# Patient Record
Sex: MALE | Race: WHITE | NOT HISPANIC OR LATINO | Employment: FULL TIME | URBAN - METROPOLITAN AREA
[De-identification: names, ages, dates, MRNs, and addresses within clinical notes are randomized per-mention and may not be internally consistent; named-entity substitution may affect disease eponyms.]

---

## 2021-03-01 ENCOUNTER — OFFICE VISIT (OUTPATIENT)
Dept: ENDOCRINOLOGY | Facility: CLINIC | Age: 61
End: 2021-03-01

## 2021-03-01 ENCOUNTER — TELEPHONE (OUTPATIENT)
Dept: ENDOCRINOLOGY | Facility: CLINIC | Age: 61
End: 2021-03-01

## 2021-03-01 VITALS
DIASTOLIC BLOOD PRESSURE: 80 MMHG | HEART RATE: 74 BPM | WEIGHT: 202.8 LBS | HEIGHT: 68 IN | TEMPERATURE: 99.6 F | OXYGEN SATURATION: 97 % | SYSTOLIC BLOOD PRESSURE: 148 MMHG | BODY MASS INDEX: 30.74 KG/M2

## 2021-03-01 DIAGNOSIS — IMO0002 DIABETES MELLITUS TYPE 2, UNCONTROLLED, WITH COMPLICATIONS: Primary | ICD-10-CM

## 2021-03-01 LAB
EXPIRATION DATE: NORMAL
HBA1C MFR BLD: 8.2 %
Lab: NORMAL

## 2021-03-01 PROCEDURE — 83036 HEMOGLOBIN GLYCOSYLATED A1C: CPT | Performed by: INTERNAL MEDICINE

## 2021-03-01 PROCEDURE — 99204 OFFICE O/P NEW MOD 45 MIN: CPT | Performed by: INTERNAL MEDICINE

## 2021-03-01 RX ORDER — ZOLPIDEM TARTRATE 5 MG/1
5 TABLET ORAL AS NEEDED
COMMUNITY
Start: 2020-12-03

## 2021-03-01 RX ORDER — FLUTICASONE PROPIONATE 50 MCG
SPRAY, SUSPENSION (ML) NASAL AS NEEDED
COMMUNITY
Start: 2020-12-03

## 2021-03-01 RX ORDER — PANTOPRAZOLE SODIUM 40 MG/1
40 TABLET, DELAYED RELEASE ORAL DAILY
COMMUNITY
Start: 2020-12-09

## 2021-03-01 RX ORDER — MAGNESIUM OXIDE 400 MG/1
1 TABLET ORAL DAILY PRN
COMMUNITY
Start: 2020-11-23

## 2021-03-01 RX ORDER — PIOGLITAZONEHYDROCHLORIDE 30 MG/1
30 TABLET ORAL DAILY
Qty: 30 TABLET | Refills: 11 | Status: SHIPPED | OUTPATIENT
Start: 2021-03-01 | End: 2021-06-04 | Stop reason: SDUPTHER

## 2021-03-01 RX ORDER — GLIPIZIDE 10 MG/1
10 TABLET, FILM COATED, EXTENDED RELEASE ORAL 2 TIMES DAILY
COMMUNITY
Start: 2020-12-21

## 2021-03-01 RX ORDER — SITAGLIPTIN 100 MG/1
100 TABLET, FILM COATED ORAL DAILY
COMMUNITY
Start: 2021-02-20 | End: 2021-03-01 | Stop reason: CLARIF

## 2021-03-01 RX ORDER — MONTELUKAST SODIUM 10 MG/1
10 TABLET ORAL DAILY
COMMUNITY
Start: 2020-12-03

## 2021-03-01 RX ORDER — AMLODIPINE BESYLATE 5 MG/1
5 TABLET ORAL 2 TIMES DAILY
COMMUNITY
Start: 2020-12-03

## 2021-03-01 RX ORDER — LORATADINE 10 MG/1
10 TABLET ORAL DAILY
COMMUNITY
Start: 2020-12-03

## 2021-03-01 NOTE — PROGRESS NOTES
"     Office Note      Date: 2021  Patient Name: Wolf Bai  MRN: [id number]  : 1960    Chief Complaint   Patient presents with   • Diabetes       History of Present Illness:   Wolf Bai is a 61 y.o. male who presents for Diabetes- type 2  He is seen as a new pt today.  He  Recalls being diagnosed with type 2 diabetes @ 5 years ago and put on metformin. But that messed with his heart. Then he was put on sfu andthen januvia but he struggles to afford januvia and last a1c was 8.7.  He checks bg once daily and has not had hypoglycemia.  He has htn and is overweight     Hemoglobin A1C   Date Value Ref Range Status   2021 8.2 % Final       Subjective      Diabetic Complications:  Eyes: No  Kidneys: No  Feet: No  Heart: No    Diet and Exercise:  Meals per day: 2  Minutes of exercise per week: <100 mins.    Review of Systems:   Review of Systems   Constitutional: Positive for fatigue.   HENT: Negative.    Eyes: Negative.    Respiratory: Negative.    Cardiovascular: Negative.    Gastrointestinal: Negative.    Endocrine: Negative.    Genitourinary: Negative.    Musculoskeletal: Negative.    Skin: Negative.    Allergic/Immunologic: Negative.    Neurological: Negative.    Hematological: Negative.    Psychiatric/Behavioral: Negative.        The following portions of the patient's history were reviewed and updated as appropriate: allergies, current medications, past family history, past medical history, past social history, past surgical history and problem list.    Objective     Visit Vitals  /80   Pulse 74   Temp 99.6 °F (37.6 °C) (Infrared)   Ht 172.7 cm (68\")   Wt 92 kg (202 lb 12.8 oz)   SpO2 97%   BMI 30.84 kg/m²         Physical Exam:  Physical Exam  Vitals signs reviewed.   Constitutional:       General: He is not in acute distress.     Appearance: Normal appearance. He is normal weight. He is not ill-appearing, toxic-appearing or diaphoretic.   HENT:      Head: Normocephalic and atraumatic.   "   Eyes:      Extraocular Movements: Extraocular movements intact.      Pupils: Pupils are equal, round, and reactive to light.   Neck:      Comments: No goiter  Cardiovascular:      Rate and Rhythm: Normal rate and regular rhythm.      Pulses: Normal pulses.           Dorsalis pedis pulses are 2+ on the right side and 2+ on the left side.        Posterior tibial pulses are 2+ on the right side and 2+ on the left side.      Heart sounds: No murmur.   Pulmonary:      Effort: Pulmonary effort is normal. No respiratory distress.   Musculoskeletal: Normal range of motion.         General: No swelling.      Right foot: Normal range of motion. No deformity, bunion, Charcot foot, foot drop or prominent metatarsal heads.      Left foot: Normal range of motion. No deformity, bunion, Charcot foot, foot drop or prominent metatarsal heads.   Feet:      Right foot:      Protective Sensation: 5 sites tested. 5 sites sensed.      Skin integrity: Skin integrity normal.      Toenail Condition: Right toenails are normal.      Left foot:      Protective Sensation: 5 sites tested. 5 sites sensed.      Skin integrity: Skin integrity normal.      Toenail Condition: Left toenails are normal.      Comments: Diabetic Foot Exam Performed and Monofilament Test Performed    Lymphadenopathy:      Cervical: No cervical adenopathy.   Skin:     General: Skin is warm and dry.   Neurological:      Mental Status: He is alert and oriented to person, place, and time.   Psychiatric:         Mood and Affect: Mood normal.         Thought Content: Thought content normal.         Judgment: Judgment normal.         Assessment / Plan      Assessment & Plan:  Problem List Items Addressed This Visit        Other    Diabetes mellitus type 2, uncontrolled, with complications (CMS/[facility]) - Primary    Overview     intolerant to metformin          Current Assessment & Plan     A new issue to me  a1c not at goal  Cost of meds- particularly januvia- has been a big  issue.  Intolerant to metformin.  Will stop januvia and add cm.  Side effects were discussed  He will work to restrict carbs andincrease activity          Relevant Medications    glipizide (GLUCOTROL XL) 10 MG 24 hr tablet    pioglitazone (Actos) 30 MG tablet    Other Relevant Orders    POC Glycosylated Hemoglobin (Hb A1C) (Completed)           Goran Becerra MD   03/01/2021

## 2021-03-01 NOTE — ASSESSMENT & PLAN NOTE
A new issue to me  a1c not at goal  Cost of meds- particularly januvia- has been a big issue.  Intolerant to metformin.  Will stop januvia and add cm.  Side effects were discussed  He will work to restrict carbs andincrease activity

## 2021-03-01 NOTE — TELEPHONE ENCOUNTER
Message from Plan  PA Case: [id number], Status: Approved, Coverage Starts on: 3/1/2021 12:00:00 AM, Coverage Ends on: 3/1/2022 12:00:00 AM.

## 2021-06-04 ENCOUNTER — OFFICE VISIT (OUTPATIENT)
Dept: ENDOCRINOLOGY | Facility: CLINIC | Age: 61
End: 2021-06-04

## 2021-06-04 VITALS
BODY MASS INDEX: 31.04 KG/M2 | WEIGHT: 204.8 LBS | OXYGEN SATURATION: 95 % | SYSTOLIC BLOOD PRESSURE: 130 MMHG | HEART RATE: 74 BPM | HEIGHT: 68 IN | DIASTOLIC BLOOD PRESSURE: 70 MMHG

## 2021-06-04 DIAGNOSIS — E11.9 TYPE 2 DIABETES MELLITUS WITHOUT COMPLICATION, WITHOUT LONG-TERM CURRENT USE OF INSULIN (HCC): Primary | ICD-10-CM

## 2021-06-04 PROBLEM — IMO0002 DIABETES MELLITUS TYPE 2, UNCONTROLLED, WITH COMPLICATIONS: Status: RESOLVED | Noted: 2021-03-01 | Resolved: 2021-06-04

## 2021-06-04 LAB
EXPIRATION DATE: ABNORMAL
EXPIRATION DATE: NORMAL
GLUCOSE BLDC GLUCOMTR-MCNC: 140 MG/DL (ref 70–130)
HBA1C MFR BLD: 6.6 %
Lab: ABNORMAL
Lab: NORMAL

## 2021-06-04 PROCEDURE — 83036 HEMOGLOBIN GLYCOSYLATED A1C: CPT | Performed by: INTERNAL MEDICINE

## 2021-06-04 PROCEDURE — 82947 ASSAY GLUCOSE BLOOD QUANT: CPT | Performed by: INTERNAL MEDICINE

## 2021-06-04 PROCEDURE — 99213 OFFICE O/P EST LOW 20 MIN: CPT | Performed by: INTERNAL MEDICINE

## 2021-06-04 RX ORDER — PIOGLITAZONEHYDROCHLORIDE 30 MG/1
30 TABLET ORAL DAILY
Qty: 90 TABLET | Refills: 3 | Status: SHIPPED | OUTPATIENT
Start: 2021-06-04 | End: 2021-06-14

## 2021-06-04 RX ORDER — SILDENAFIL 50 MG/1
50 TABLET, FILM COATED ORAL DAILY PRN
COMMUNITY
Start: 2021-04-09

## 2021-06-04 RX ORDER — LANOLIN ALCOHOL/MO/W.PET/CERES
1 CREAM (GRAM) TOPICAL DAILY PRN
COMMUNITY
Start: 2021-05-12

## 2021-06-04 NOTE — PROGRESS NOTES
"     Office Note      Date: 2021  Patient Name: Wolf Bai  MRN: [id number]  : 1960    Chief Complaint   Patient presents with   • Diabetes       History of Present Illness:   Wolf Bai is a 61 y.o. male who presents for Diabetes - type 2  On sfu and cm.  bg checks are done bid.  He has not had hypoglycemia    Last A1c:  Hemoglobin A1C   Date Value Ref Range Status   2021 6.6 % Final       Changes in health since last visit: none . Last eye exam up to date  Full labs are up to date  Feet are good .    Subjective      .    Review of Systems:   Review of Systems   Constitutional: Negative.    HENT: Negative.    Eyes: Negative.    Respiratory: Negative.        The following portions of the patient's history were reviewed and updated as appropriate: allergies, current medications, past family history, past medical history, past social history, past surgical history and problem list.    Objective     Visit Vitals  /70   Pulse 74   Ht 172.7 cm (68\")   Wt 92.9 kg (204 lb 12.8 oz)   SpO2 95%   BMI 31.14 kg/m²         Physical Exam:  Physical Exam  Vitals reviewed.   Constitutional:       Appearance: Normal appearance.   Neurological:      Mental Status: He is alert and oriented to person, place, and time.   Psychiatric:         Mood and Affect: Mood normal.         Thought Content: Thought content normal.         Judgment: Judgment normal.          Assessment / Plan      Assessment & Plan:  Problem List Items Addressed This Visit        Other    Type 2 diabetes mellitus without complication, without long-term current use of insulin (CMS/[facility]) - Primary    Current Assessment & Plan     Diabetes is improving with treatment.   Continue current treatment regimen.  Diabetes will be reassessed in 6 months.         Relevant Medications    glipizide (GLUCOTROL XL) 10 MG 24 hr tablet    pioglitazone (Actos) 30 MG tablet    Other Relevant Orders    POC Glycosylated Hemoglobin (Hb A1C) (Completed)    POC " Glucose, Blood (Completed)           Goran Becerra MD   06/04/2021

## 2021-06-12 DIAGNOSIS — E11.9 TYPE 2 DIABETES MELLITUS WITHOUT COMPLICATION, WITHOUT LONG-TERM CURRENT USE OF INSULIN (HCC): ICD-10-CM

## 2021-06-14 RX ORDER — PIOGLITAZONEHYDROCHLORIDE 30 MG/1
30 TABLET ORAL DAILY
Qty: 90 TABLET | Refills: 3 | Status: SHIPPED | OUTPATIENT
Start: 2021-06-14 | End: 2022-06-09 | Stop reason: SDUPTHER

## 2022-01-31 ENCOUNTER — PRIOR AUTHORIZATION (OUTPATIENT)
Dept: ENDOCRINOLOGY | Facility: CLINIC | Age: 62
End: 2022-01-31

## 2022-01-31 NOTE — TELEPHONE ENCOUNTER
AUGUSTO DOAN (Key: [id number])  Pioglitazone HCl 30MG tablets     Form  [employer] Form (2017 [employer])  Created  1 day ago  Sent to Plan  less than a minute ago  Plan Response  less than a minute ago  Submit Clinical Questions  Determination  N/A  Message from Plan  The member recently filled this medication and will be able to return for their next refill according to their plan limits.

## 2022-06-09 DIAGNOSIS — E11.9 TYPE 2 DIABETES MELLITUS WITHOUT COMPLICATION, WITHOUT LONG-TERM CURRENT USE OF INSULIN: ICD-10-CM

## 2022-06-09 RX ORDER — PIOGLITAZONEHYDROCHLORIDE 30 MG/1
30 TABLET ORAL DAILY
Qty: 90 TABLET | Refills: 0 | Status: SHIPPED | OUTPATIENT
Start: 2022-06-09 | End: 2022-09-07

## 2022-09-07 DIAGNOSIS — E11.9 TYPE 2 DIABETES MELLITUS WITHOUT COMPLICATION, WITHOUT LONG-TERM CURRENT USE OF INSULIN: ICD-10-CM

## 2022-09-07 RX ORDER — PIOGLITAZONEHYDROCHLORIDE 30 MG/1
TABLET ORAL
Qty: 90 TABLET | Refills: 0 | Status: SHIPPED | OUTPATIENT
Start: 2022-09-07 | End: 2023-03-02 | Stop reason: SDUPTHER

## 2022-09-07 NOTE — TELEPHONE ENCOUNTER
Last seen on 6-4-21, 1 appt canceled by provider, 1 cancled by pt and no appt scheduled. Left message to call and schedule appt on 6-9-22

## 2023-03-02 ENCOUNTER — OFFICE VISIT (OUTPATIENT)
Dept: ENDOCRINOLOGY | Facility: CLINIC | Age: 63
End: 2023-03-02
Payer: COMMERCIAL

## 2023-03-02 VITALS
OXYGEN SATURATION: 96 % | HEIGHT: 67 IN | HEART RATE: 81 BPM | SYSTOLIC BLOOD PRESSURE: 116 MMHG | WEIGHT: 218 LBS | BODY MASS INDEX: 34.21 KG/M2 | DIASTOLIC BLOOD PRESSURE: 82 MMHG

## 2023-03-02 DIAGNOSIS — E11.9 TYPE 2 DIABETES MELLITUS WITHOUT COMPLICATION, WITHOUT LONG-TERM CURRENT USE OF INSULIN: Primary | ICD-10-CM

## 2023-03-02 LAB
EXPIRATION DATE: NORMAL
EXPIRATION DATE: NORMAL
GLUCOSE BLDC GLUCOMTR-MCNC: 96 MG/DL (ref 70–130)
HBA1C MFR BLD: 6.9 %
Lab: NORMAL
Lab: NORMAL

## 2023-03-02 PROCEDURE — 99213 OFFICE O/P EST LOW 20 MIN: CPT | Performed by: INTERNAL MEDICINE

## 2023-03-02 PROCEDURE — 82947 ASSAY GLUCOSE BLOOD QUANT: CPT | Performed by: INTERNAL MEDICINE

## 2023-03-02 PROCEDURE — 83036 HEMOGLOBIN GLYCOSYLATED A1C: CPT | Performed by: INTERNAL MEDICINE

## 2023-03-02 RX ORDER — BUDESONIDE 0.5 MG/2ML
INHALANT ORAL
COMMUNITY
Start: 2023-02-28

## 2023-03-02 RX ORDER — PIOGLITAZONEHYDROCHLORIDE 30 MG/1
30 TABLET ORAL DAILY
Qty: 90 TABLET | Refills: 3 | Status: SHIPPED | OUTPATIENT
Start: 2023-03-02

## 2023-03-02 NOTE — PROGRESS NOTES
"     Office Note      Date: 2023  Patient Name: Wolf Bai  MRN: [id number]  : 1960    Chief Complaint   Patient presents with   • Diabetes       History of Present Illness:   Wolf Bai is a 63 y.o. male who presents for Diabetes type 2.   Current RX glipizide and sfu     Bg checks are done:daily usually   Hypoglycemia :none  Lastly they have been high      Last A1c:  Hemoglobin A1C   Date Value Ref Range Status   2023 6.9 % Final   2022 5.6 <5.7 % Final       Changes in health since last visit: knee surgery and uses an new steroid nasal rinse . Last eye exam 2 months ago  Routine labs are done by [facility] .    Subjective              Review of Systems:   Review of Systems   Constitutional: Negative.    HENT: Negative.    Eyes: Negative.    Respiratory: Negative.        The following portions of the patient's history were reviewed and updated as appropriate: allergies, current medications, past family history, past medical history, past social history, past surgical history and problem list.    Objective     Visit Vitals  /82   Pulse 81   Ht 170.2 cm (67\")   Wt 98.9 kg (218 lb)   SpO2 96%   BMI 34.14 kg/m²           Physical Exam:  Physical Exam  Vitals reviewed.   Constitutional:       Appearance: Normal appearance. He is normal weight.   Cardiovascular:      Pulses:           Dorsalis pedis pulses are 2+ on the right side and 2+ on the left side.        Posterior tibial pulses are 2+ on the right side and 2+ on the left side.   Musculoskeletal:      Right foot: Normal range of motion. No deformity, bunion, Charcot foot, foot drop or prominent metatarsal heads.      Left foot: Normal range of motion. No deformity, bunion, Charcot foot, foot drop or prominent metatarsal heads.   Feet:      Right foot:      Protective Sensation: 10 sites tested. 10 sites sensed.      Skin integrity: Skin integrity normal.      Toenail Condition: Right toenails are normal.      Left foot:      Protective " Sensation: 10 sites tested. 10 sites sensed.      Skin integrity: Skin integrity normal.      Toenail Condition: Left toenails are normal.      Comments: Diabetic Foot Exam Performed and Monofilament Test Performed    Neurological:      Mental Status: He is alert.   Psychiatric:         Mood and Affect: Mood normal.         Behavior: Behavior normal.         Thought Content: Thought content normal.         Judgment: Judgment normal.          Assessment / Plan      Assessment & Plan:  Problem List Items Addressed This Visit        Other    Type 2 diabetes mellitus without complication, without long-term current use of insulin ([facility]) - Primary    Current Assessment & Plan     Unchanged. a1c acceptable. No changes are needed.          Relevant Medications    glipizide (GLUCOTROL XL) 10 MG 24 hr tablet    pioglitazone (ACTOS) 30 MG tablet    Other Relevant Orders    POC Glucose, Blood (Completed)    POC Glycosylated Hemoglobin (Hb A1C) (Completed)        Goran Becerra MD   03/02/2023

## 2023-09-01 ENCOUNTER — OFFICE VISIT (OUTPATIENT)
Dept: ENDOCRINOLOGY | Facility: CLINIC | Age: 63
End: 2023-09-01
Payer: COMMERCIAL

## 2023-09-01 VITALS
HEIGHT: 67 IN | DIASTOLIC BLOOD PRESSURE: 78 MMHG | WEIGHT: 213.8 LBS | SYSTOLIC BLOOD PRESSURE: 140 MMHG | OXYGEN SATURATION: 95 % | BODY MASS INDEX: 33.56 KG/M2 | HEART RATE: 65 BPM

## 2023-09-01 DIAGNOSIS — E11.9 TYPE 2 DIABETES MELLITUS WITHOUT COMPLICATION, WITHOUT LONG-TERM CURRENT USE OF INSULIN: Primary | ICD-10-CM

## 2023-09-01 LAB
ALBUMIN UR-MCNC: 21.7 MG/DL
CREAT UR-MCNC: 29.9 MG/DL
EXPIRATION DATE: ABNORMAL
GLUCOSE BLDC GLUCOMTR-MCNC: 183 MG/DL (ref 70–130)
Lab: ABNORMAL
MICROALBUMIN/CREAT UR: 725.8 MG/G

## 2023-09-01 PROCEDURE — 82043 UR ALBUMIN QUANTITATIVE: CPT | Performed by: INTERNAL MEDICINE

## 2023-09-01 PROCEDURE — 82570 ASSAY OF URINE CREATININE: CPT | Performed by: INTERNAL MEDICINE

## 2023-09-01 NOTE — PROGRESS NOTES
"     Office Note      Date: 2023  Patient Name: Wolf Bai  MRN: [id number]  : 1960    Chief Complaint   Patient presents with    Diabetes     Type 2 diabetes mellitus without complication, without long-term current use of insulin       History of Present Illness:   Wolf Bai is a 63 y.o. male who [id number] for Diabetes type 2.   Current RX sfu and cm     Bg checks are done:rare  Hypoglycemia :never       Last A1c:6.6 by pcp recently   Hemoglobin A1C   Date Value Ref Range Status   2023 6.9 % Final   2022 5.6 <5.7 % Final       Changes in health since last visit: none . Last eye exam up to date.    Subjective            Review of Systems:   Review of Systems   Respiratory:  Negative for chest tightness and shortness of breath.      The following portions of the patient's history were reviewed and updated as appropriate: allergies, current medications, past family history, past medical history, past social history, past surgical history, and problem list.    Objective     Visit Vitals  /78 (BP Location: Right arm, Patient Position: Sitting, Cuff Size: Adult)   Pulse 65   Ht 170.2 cm (67\")   Wt 97 kg (213 lb 12.8 oz)   SpO2 95%   BMI 33.49 kg/mý           Physical Exam:  Physical Exam  Vitals reviewed.   Constitutional:       Appearance: Normal appearance.   Neurological:      Mental Status: He is alert.   Psychiatric:         Mood and Affect: Mood normal.         Thought Content: Thought content normal.         Judgment: Judgment normal.        Assessment / Plan      Assessment & Plan:  Problem List Items Addressed This Visit          Other    Type 2 diabetes mellitus without complication, without long-term current use of insulin - Primary    Current Assessment & Plan     Diabetes is improving with treatment.   Continue current treatment regimen.  Diabetes will be reassessed in 6 months.         Relevant Medications    glipizide (GLUCOTROL XL) 10 MG 24 hr tablet    pioglitazone " (ACTOS) 30 MG tablet    Other Relevant Orders    POC Glucose, Blood (Completed)    Microalbumin / Creatinine Urine Ratio - Urine, Clean Catch        Goran Fareed Becerra MD   09/01/2023

## 2023-12-12 ENCOUNTER — PRIOR AUTHORIZATION (OUTPATIENT)
Dept: ENDOCRINOLOGY | Facility: CLINIC | Age: 63
End: 2023-12-12
Payer: COMMERCIAL

## 2023-12-12 NOTE — TELEPHONE ENCOUNTER
Wolf Bai Key: [id number] - PA Case ID: [id number] help? Call us at [phone]  Outcome  Approvedtoday  PA Case: [id number], Status: Approved, Coverage Starts on: 12/12/2023 12:00:00 AM, Coverage Ends on: 12/11/2024 12:00:00 AM.  Drug  Pioglitazone HCl 30MG tablets  Form  [employer] PA Form (2017 NCPDP)

## 2024-03-08 ENCOUNTER — OFFICE VISIT (OUTPATIENT)
Dept: ENDOCRINOLOGY | Facility: CLINIC | Age: 64
End: 2024-03-08
Payer: COMMERCIAL

## 2024-03-08 VITALS
OXYGEN SATURATION: 97 % | HEIGHT: 67 IN | SYSTOLIC BLOOD PRESSURE: 120 MMHG | HEART RATE: 64 BPM | WEIGHT: 212 LBS | DIASTOLIC BLOOD PRESSURE: 64 MMHG | BODY MASS INDEX: 33.27 KG/M2

## 2024-03-08 DIAGNOSIS — E11.9 TYPE 2 DIABETES MELLITUS WITHOUT COMPLICATION, WITHOUT LONG-TERM CURRENT USE OF INSULIN: Primary | ICD-10-CM

## 2024-03-08 LAB
EXPIRATION DATE: ABNORMAL
EXPIRATION DATE: ABNORMAL
GLUCOSE BLDC GLUCOMTR-MCNC: 212 MG/DL (ref 70–130)
HBA1C MFR BLD: 6.1 % (ref 4.5–5.7)
Lab: ABNORMAL
Lab: ABNORMAL

## 2024-03-08 RX ORDER — PIOGLITAZONEHYDROCHLORIDE 30 MG/1
30 TABLET ORAL DAILY
Qty: 90 TABLET | Refills: 3 | Status: SHIPPED | OUTPATIENT
Start: 2024-03-08

## 2024-03-08 NOTE — ASSESSMENT & PLAN NOTE
UNCHANGED. GOOD CONTROL WITH A1C <6.5  NO CHANGES NEEDED   EYE EXAM ABOUT DUE  FOOT CHECK UP TO DATE  LIPIDS AND SHAILA CHECKED ELSEWHERE

## 2024-03-08 NOTE — PROGRESS NOTES
"     Office Note      Date: 2024  Patient Name: Wolf Bai  MRN: [id number]  : 1960    Chief Complaint   Patient presents with    Diabetes     Type 2 diabetes mellitus without complication, without long-term current use of insulin         History of Present Illness:   Wolf Bai is a 64 y.o. male who presents for Diabetes type 2.   Current RX SFU AND ISAIAS     Bg checks are done:USUALLY DAILY BUT LATELY HAS BEEN SLAKC   Hypoglycemia :NONE       Last A1c:  Hemoglobin A1C   Date Value Ref Range Status   2024 6.1 (A) 4.5 - 5.7 % Final   2022 5.6 <5.7 % Final       Changes in health since last visit: NONE . Last eye exam ABOUT DUE LIPIDS AND SHAILA ARE DONE BY PCP .    Subjective              Review of Systems:   Review of Systems   Endocrine: Negative for polydipsia and polyuria.       The following portions of the patient's history were reviewed and updated as appropriate: allergies, current medications, past family history, past medical history, past social history, past surgical history, and problem list.    Objective     Visit Vitals  /64 (BP Location: Left arm, Patient Position: Sitting, Cuff Size: Adult)   Pulse 64   Ht 170.2 cm (67\")   Wt 96.2 kg (212 lb)   SpO2 97%   BMI 33.20 kg/m²           Physical Exam:  Physical Exam  Vitals reviewed.   Constitutional:       Appearance: Normal appearance. He is normal weight.   Cardiovascular:      Pulses:           Dorsalis pedis pulses are 2+ on the right side and 2+ on the left side.        Posterior tibial pulses are 2+ on the right side and 2+ on the left side.   Musculoskeletal:      Right foot: Normal range of motion. No deformity, bunion, Charcot foot, foot drop or prominent metatarsal heads.      Left foot: Normal range of motion. No deformity, bunion, Charcot foot, foot drop or prominent metatarsal heads.   Feet:      Right foot:      Protective Sensation: 10 sites tested.  10 sites sensed.      Skin integrity: Skin integrity normal. "      Toenail Condition: Right toenails are normal.      Left foot:      Protective Sensation: 10 sites tested.  10 sites sensed.      Skin integrity: Skin integrity normal.      Toenail Condition: Left toenails are normal.      Comments: Diabetic Foot Exam Performed    Neurological:      Mental Status: He is alert.   Psychiatric:         Mood and Affect: Mood normal.         Behavior: Behavior normal.         Thought Content: Thought content normal.         Judgment: Judgment normal.          Assessment / Plan      Assessment & Plan:  Problem List Items Addressed This Visit          Other    Type 2 diabetes mellitus without complication, without long-term current use of insulin - Primary    Current Assessment & Plan      UNCHANGED. GOOD CONTROL WITH A1C <6.5  NO CHANGES NEEDED   EYE EXAM ABOUT DUE  FOOT CHECK UP TO DATE  LIPIDS AND SHAILA CHECKED ELSEWHERE          Relevant Medications    glipizide (GLUCOTROL XL) 10 MG 24 hr tablet    pioglitazone (ACTOS) 30 MG tablet    Other Relevant Orders    POC Glycosylated Hemoglobin (Hb A1C) (Completed)    POC Glucose, Blood (Completed)         Electronically signed by : Goran Becerra MD  03/08/2024

## 2025-03-29 DIAGNOSIS — E11.9 TYPE 2 DIABETES MELLITUS WITHOUT COMPLICATION, WITHOUT LONG-TERM CURRENT USE OF INSULIN: ICD-10-CM

## 2025-03-31 RX ORDER — PIOGLITAZONE 30 MG/1
30 TABLET ORAL DAILY
Qty: 89 TABLET | Refills: 0 | OUTPATIENT
Start: 2025-03-31

## 2025-03-31 NOTE — TELEPHONE ENCOUNTER
Rx Refill Note  Requested Prescriptions     Pending Prescriptions Disp Refills    pioglitazone (ACTOS) 30 MG tablet [Pharmacy Med Name: PIOGLITAZONE 30MG   TAB] 89 tablet 0     Sig: Take 1 tablet by mouth once daily      Last office visit with prescribing clinician: 3/8/2024   Last telemedicine visit with prescribing clinician: Visit date not found   Next office visit with prescribing clinician: Visit date not found         Kourtney Kimball MA  03/31/25, 11:22 EDT

## 2025-04-02 ENCOUNTER — PRIOR AUTHORIZATION (OUTPATIENT)
Dept: ENDOCRINOLOGY | Facility: CLINIC | Age: 65
End: 2025-04-02
Payer: COMMERCIAL

## 2025-04-02 DIAGNOSIS — E11.9 TYPE 2 DIABETES MELLITUS WITHOUT COMPLICATION, WITHOUT LONG-TERM CURRENT USE OF INSULIN: ICD-10-CM

## 2025-04-02 RX ORDER — PIOGLITAZONE 30 MG/1
30 TABLET ORAL DAILY
Qty: 89 TABLET | Refills: 0 | Status: SHIPPED | OUTPATIENT
Start: 2025-04-02

## 2025-04-02 NOTE — TELEPHONE ENCOUNTER
Rx Refill Note  Requested Prescriptions     Pending Prescriptions Disp Refills    pioglitazone (ACTOS) 30 MG tablet [Pharmacy Med Name: PIOGLITAZONE 30MG   TAB] 89 tablet 0     Sig: Take 1 tablet by mouth once daily      Last office visit with prescribing clinician: 3/8/2024      Next office visit with prescribing clinician: 4/8/2025       Viridiana Khan MA  04/02/25, 10:29 EDT

## 2025-04-03 NOTE — TELEPHONE ENCOUNTER
Wolf Bai ([employer]: [id number])  PA Case ID #: [id number]  Rx #: [id number]  Need Help? Call us at [phone]  Outcome  Approved on April 2 by [employer] 2017  PA Case: [id number], Status: Approved, Coverage Starts on: 4/2/2025 12:00:00 AM, Coverage Ends on: 4/2/2026 12:00:00 AM.  Effective Date: 4/2/2025  Authorization Expiration Date: 4/2/2026  Drug  Pioglitazone HCl 30MG tablets  ePA cloud logo  Form  [employer] Form (2017 NCPDP)

## 2025-04-08 ENCOUNTER — OFFICE VISIT (OUTPATIENT)
Age: 65
End: 2025-04-08
Payer: COMMERCIAL

## 2025-04-08 VITALS
WEIGHT: 216 LBS | BODY MASS INDEX: 33.9 KG/M2 | OXYGEN SATURATION: 99 % | HEART RATE: 65 BPM | HEIGHT: 67 IN | DIASTOLIC BLOOD PRESSURE: 78 MMHG | SYSTOLIC BLOOD PRESSURE: 134 MMHG

## 2025-04-08 DIAGNOSIS — E11.9 TYPE 2 DIABETES MELLITUS WITHOUT COMPLICATION, WITHOUT LONG-TERM CURRENT USE OF INSULIN: Primary | ICD-10-CM

## 2025-04-08 LAB
EXPIRATION DATE: ABNORMAL
EXPIRATION DATE: ABNORMAL
GLUCOSE BLDC GLUCOMTR-MCNC: 239 MG/DL (ref 70–130)
HBA1C MFR BLD: 6.4 % (ref 4.5–5.7)
Lab: ABNORMAL
Lab: ABNORMAL

## 2025-04-08 RX ORDER — PIOGLITAZONE 30 MG/1
30 TABLET ORAL DAILY
Qty: 90 TABLET | Refills: 3 | Status: SHIPPED | OUTPATIENT
Start: 2025-04-08

## 2025-04-08 RX ORDER — GLIPIZIDE 10 MG/1
10 TABLET, FILM COATED, EXTENDED RELEASE ORAL DAILY
Qty: 90 TABLET | Refills: 3 | Status: SHIPPED | OUTPATIENT
Start: 2025-04-08

## 2025-04-08 NOTE — ASSESSMENT & PLAN NOTE
Eyes- up to date  Kidneys and lipids checked by pcp   Feet- checked today    Assessment- stable  Plan : no changes. Recheck 6 francisco

## 2025-04-08 NOTE — PROGRESS NOTES
"     Office Note      Date: 2025  Patient Name: Wolf Bai  MRN: [id number]  : 1960    Chief Complaint   Patient presents with    Diabetes       History of Present Illness:   Wolf Bai is a 65 y.o. male who presents for Diabetes type 2.   Current RX sfu and cm     Bg checks are done:occasionally   Hypoglycemia :nothing severe       Last A1c:  Hemoglobin A1C   Date Value Ref Range Status   2025 6.4 (A) 4.5 - 5.7 % Final   2022 5.6 <5.7 % Final       Changes in health since last visit: sinus surgery .   Subjective              Review of Systems:   Review of Systems   Endocrine: Negative for polydipsia and polyuria.       The following portions of the patient's history were reviewed and updated as appropriate: allergies, current medications, past family history, past medical history, past social history, past surgical history, and problem list.    Objective     Visit Vitals  /78 (BP Location: Left arm, Patient Position: Sitting, Cuff Size: Adult)   Pulse 65   Ht 170.2 cm (67\")   Wt 98 kg (216 lb)   SpO2 99%   BMI 33.83 kg/m²           Physical Exam:  Physical Exam  Vitals reviewed.   Constitutional:       Appearance: Normal appearance. He is normal weight.   Cardiovascular:      Pulses:           Dorsalis pedis pulses are 2+ on the right side and 2+ on the left side.        Posterior tibial pulses are 2+ on the right side and 2+ on the left side.   Musculoskeletal:      Right foot: Normal range of motion. No deformity, bunion, Charcot foot, foot drop or prominent metatarsal heads.      Left foot: Normal range of motion. No deformity, bunion, Charcot foot, foot drop or prominent metatarsal heads.   Feet:      Right foot:      Protective Sensation: 10 sites tested.  10 sites sensed.      Skin integrity: Skin integrity normal.      Toenail Condition: Right toenails are normal.      Left foot:      Protective Sensation: 10 sites tested.  10 sites sensed.      Skin integrity: Skin " integrity normal.      Toenail Condition: Left toenails are normal.      Comments: Diabetic Foot Exam Performed    Neurological:      Mental Status: He is alert.   Psychiatric:         Mood and Affect: Mood normal.         Behavior: Behavior normal.         Thought Content: Thought content normal.         Judgment: Judgment normal.          Assessment / Plan      Assessment & Plan:  Problem List Items Addressed This Visit       Type 2 diabetes mellitus without complication, without long-term current use of insulin - Primary    Current Assessment & Plan    Eyes- up to date  Kidneys and lipids checked by pcp   Feet- checked today    Assessment- stable  Plan : no changes. Recheck 6 montsh          Relevant Medications    pioglitazone (ACTOS) 30 MG tablet    glipizide (GLUCOTROL XL) 10 MG 24 hr tablet    Other Relevant Orders    POC Glucose, Blood (Completed)    POC Glycosylated Hemoglobin (Hb A1C) (Completed)         Electronically signed by : Goran Becerra MD  04/08/2025

## 2025-06-27 DIAGNOSIS — E11.9 TYPE 2 DIABETES MELLITUS WITHOUT COMPLICATION, WITHOUT LONG-TERM CURRENT USE OF INSULIN: ICD-10-CM

## 2025-06-27 RX ORDER — PIOGLITAZONE 30 MG/1
30 TABLET ORAL DAILY
Qty: 90 TABLET | Refills: 0 | Status: SHIPPED | OUTPATIENT
Start: 2025-06-27

## 2025-06-27 NOTE — TELEPHONE ENCOUNTER
Rx Refill Note  Requested Prescriptions     Pending Prescriptions Disp Refills    pioglitazone (ACTOS) 30 MG tablet [Pharmacy Med Name: PIOGLITAZONE 30MG   TAB] 90 tablet 0     Sig: Take 1 tablet by mouth once daily      Last office visit with prescribing clinician: 4/8/2025      Next office visit with prescribing clinician: 10/24/2025       Viridiana Khan MA  06/27/25, 10:26 EDT

## 2025-07-21 DIAGNOSIS — E11.9 TYPE 2 DIABETES MELLITUS WITHOUT COMPLICATION, WITHOUT LONG-TERM CURRENT USE OF INSULIN: ICD-10-CM

## 2025-07-21 RX ORDER — PIOGLITAZONE 30 MG/1
30 TABLET ORAL DAILY
Qty: 90 TABLET | Refills: 0 | Status: SHIPPED | OUTPATIENT
Start: 2025-07-21

## 2025-07-21 NOTE — TELEPHONE ENCOUNTER
Caller: Wolf Bai    Relationship: Self    Best call back number: [phone]    Requested Prescriptions:   Requested Prescriptions     Pending Prescriptions Disp Refills    pioglitazone (ACTOS) 30 MG tablet 90 tablet 0     Sig: Take 1 tablet by mouth Daily.        Pharmacy where request should be sent:  [facility] [place]    Last office visit with prescribing clinician: 4/8/2025   Last telemedicine visit with prescribing clinician: Visit date not found   Next office visit with prescribing clinician: 10/24/2025     Additional details provided by patient:     Does the patient have less than a 3 day supply:  [] Yes  [] No    Would you like a call back once the refill request has been completed: [] Yes [] No    If the office needs to give you a call back, can they leave a voicemail: [] Yes [] No    Dieter Caballero   07/21/25 08:28 EDT

## 2025-07-21 NOTE — TELEPHONE ENCOUNTER
Rx Refill Note  Requested Prescriptions     Pending Prescriptions Disp Refills    pioglitazone (ACTOS) 30 MG tablet 90 tablet 0     Sig: Take 1 tablet by mouth Daily.      Last office visit with prescribing clinician: 4/8/2025      Next office visit with prescribing clinician: 10/24/2025       Viridiana Khan MA  07/21/25, 11:47 EDT